# Patient Record
Sex: FEMALE | Race: WHITE
[De-identification: names, ages, dates, MRNs, and addresses within clinical notes are randomized per-mention and may not be internally consistent; named-entity substitution may affect disease eponyms.]

---

## 2020-05-25 ENCOUNTER — HOSPITAL ENCOUNTER (EMERGENCY)
Dept: HOSPITAL 4 - SED | Age: 35
Discharge: HOME | End: 2020-05-25
Payer: COMMERCIAL

## 2020-05-25 VITALS — SYSTOLIC BLOOD PRESSURE: 141 MMHG

## 2020-05-25 VITALS — BODY MASS INDEX: 26.66 KG/M2 | WEIGHT: 160 LBS | HEIGHT: 65 IN

## 2020-05-25 VITALS — SYSTOLIC BLOOD PRESSURE: 116 MMHG

## 2020-05-25 DIAGNOSIS — D36.7: ICD-10-CM

## 2020-05-25 DIAGNOSIS — R10.31: Primary | ICD-10-CM

## 2020-05-25 LAB
ALBUMIN SERPL BCP-MCNC: 4.2 G/DL (ref 3.4–4.8)
ALT SERPL W P-5'-P-CCNC: 16 U/L (ref 12–78)
ANION GAP SERPL CALCULATED.3IONS-SCNC: 8 MMOL/L (ref 5–15)
AST SERPL W P-5'-P-CCNC: 14 U/L (ref 10–37)
BASOPHILS # BLD AUTO: 0 K/UL (ref 0–0.2)
BASOPHILS NFR BLD AUTO: 0.3 % (ref 0–2)
BILIRUB SERPL-MCNC: 0.4 MG/DL (ref 0–1)
BUN SERPL-MCNC: 11 MG/DL (ref 8–21)
CALCIUM SERPL-MCNC: 8.5 MG/DL (ref 8.4–11)
CHLORIDE SERPL-SCNC: 106 MMOL/L (ref 98–107)
CREAT SERPL-MCNC: 0.7 MG/DL (ref 0.55–1.3)
EOSINOPHIL # BLD AUTO: 0 K/UL (ref 0–0.4)
EOSINOPHIL NFR BLD AUTO: 0.3 % (ref 0–4)
ERYTHROCYTE [DISTWIDTH] IN BLOOD BY AUTOMATED COUNT: 21.4 % (ref 9–15)
GFR SERPL CREATININE-BSD FRML MDRD: 122 ML/MIN (ref 90–?)
GLUCOSE SERPL-MCNC: 99 MG/DL (ref 70–99)
HCT VFR BLD AUTO: 24.7 % (ref 36–48)
HGB BLD-MCNC: 7.3 G/DL (ref 12–16)
LYMPHOCYTES # BLD AUTO: 1.7 K/UL (ref 1–5.5)
LYMPHOCYTES NFR BLD AUTO: 28.2 % (ref 20.5–51.5)
MCH RBC QN AUTO: 17 PG (ref 27–31)
MCHC RBC AUTO-ENTMCNC: 29 % (ref 32–36)
MCV RBC AUTO: 58 FL (ref 79–98)
MONOCYTES # BLD MANUAL: 0.3 K/UL (ref 0–1)
MONOCYTES # BLD MANUAL: 4.7 % (ref 1.7–9.3)
NEUTROPHILS # BLD AUTO: 4 K/UL (ref 1.8–7.7)
NEUTROPHILS NFR BLD AUTO: 66.5 % (ref 40–70)
PLATELET # BLD AUTO: 297 K/UL (ref 130–430)
POTASSIUM SERPL-SCNC: 3.5 MMOL/L (ref 3.5–5.1)
RBC # BLD AUTO: 4.29 MIL/UL (ref 4.2–6.2)
SODIUM SERPLBLD-SCNC: 134 MMOL/L (ref 136–145)
WBC # BLD AUTO: 6 K/UL (ref 4.8–10.8)

## 2020-05-25 PROCEDURE — 76830 TRANSVAGINAL US NON-OB: CPT

## 2020-05-25 PROCEDURE — 36415 COLL VENOUS BLD VENIPUNCTURE: CPT

## 2020-05-25 PROCEDURE — 76857 US EXAM PELVIC LIMITED: CPT

## 2020-05-25 PROCEDURE — 74176 CT ABD & PELVIS W/O CONTRAST: CPT

## 2020-05-25 PROCEDURE — 81002 URINALYSIS NONAUTO W/O SCOPE: CPT

## 2020-05-25 PROCEDURE — 80053 COMPREHEN METABOLIC PANEL: CPT

## 2020-05-25 PROCEDURE — 96374 THER/PROPH/DIAG INJ IV PUSH: CPT

## 2020-05-25 PROCEDURE — 85025 COMPLETE CBC W/AUTO DIFF WBC: CPT

## 2020-05-25 PROCEDURE — 81025 URINE PREGNANCY TEST: CPT

## 2020-05-25 PROCEDURE — 99285 EMERGENCY DEPT VISIT HI MDM: CPT

## 2020-05-25 NOTE — NUR
Pt. presents to the ED ambulatory, A&Ox4 with c/o of  sharp right lower pelvic 
pain that is 10/10 and began around 1900 on 5/23. Pt. states that she has 
vomited x3 times in the past 24 hours but denies other symptoms and is 
afebrile. Pt. has hx of copper IUD placed. Patient's LMP began 5/24. VSS. Will 
continue to monitor.

## 2020-05-25 NOTE — NUR
Patient given written and verbal discharge instructions and verbalizes 
understanding. ER MD discussed with patient the results and treatment provided. 
Patient in stable condition. ID arm band removed. IV catheter removed intact 
and dressing applied, no active bleeding.

Rx of Norco given. Patient educated on pain management and to follow up with 
PMD and OB/GYN. Pain Scale 0/10.

Opportunity for questions provided and answered. Medication side effect fact 
sheet provided.